# Patient Record
Sex: FEMALE | Race: WHITE | URBAN - METROPOLITAN AREA
[De-identification: names, ages, dates, MRNs, and addresses within clinical notes are randomized per-mention and may not be internally consistent; named-entity substitution may affect disease eponyms.]

---

## 2020-03-19 LAB
ALBUMIN SERPL-MCNC: 3.9 G/DL (ref 3.6–5)
ALP BLD-CCNC: 87 U/L (ref 46–116)
ALT SERPL-CCNC: 24 U/L (ref 12–78)
AST SERPL-CCNC: 16 U/L (ref 12–36)
BILIRUB SERPL-MCNC: 0.4 MG/DL (ref 0–1.1)
BILIRUBIN DIRECT: 0.1 MG/DL (ref 0–0.3)
BILIRUBIN, INDIRECT: 0.3 MG/DL (ref 0–0.9)
HCG URINE: NEGATIVE
INTERNAL QC: NORMAL
TOTAL PROTEIN: 7.8 G/DL (ref 6.3–8)

## 2020-04-01 ENCOUNTER — TELEPHONE (OUTPATIENT)
Dept: SURGERY | Age: 35
End: 2020-04-01

## 2020-04-01 NOTE — TELEPHONE ENCOUNTER
She cancelled her post-op at [facility] that was scheduled for tomorrow, but states she is still having pain. The clinic offered for her to keep her appt but she does not want to go into the hospital.  She states the pain feels the same as she did after surgery. She rates it as 7/10. She states she has the pain when she has been doing more things around the house. She deneis fever or drainage and incisions have healed very well. She has tried Tylenol but it does not help. She states she was told not to take Ibuprofen after her surgery because they put a blood thinner in her IV, but she id not taking any blood thinners since then. Please advise.

## 2021-09-15 NOTE — TELEPHONE ENCOUNTER
I tried to contact patient again, phone still states wireless caller not available. Parent phone number is disconnected. Unable to reach patient after multiple attempts. 05-Apr-2021